# Patient Record
Sex: FEMALE | Race: WHITE | Employment: OTHER | URBAN - METROPOLITAN AREA
[De-identification: names, ages, dates, MRNs, and addresses within clinical notes are randomized per-mention and may not be internally consistent; named-entity substitution may affect disease eponyms.]

---

## 2017-10-10 ENCOUNTER — HOSPITAL ENCOUNTER (OUTPATIENT)
Dept: GENERAL RADIOLOGY | Age: 67
Discharge: HOME | End: 2017-10-10
Payer: MEDICARE

## 2017-10-10 DIAGNOSIS — R13.12 DYSPHAGIA, OROPHARYNGEAL PHASE: ICD-10-CM

## 2017-10-10 PROCEDURE — 74230 X-RAY XM SWLNG FUNCJ C+: CPT

## 2017-10-10 PROCEDURE — G8998 SWALLOW D/C STATUS: HCPCS

## 2017-10-10 PROCEDURE — G8996 SWALLOW CURRENT STATUS: HCPCS

## 2017-10-10 PROCEDURE — G8997 SWALLOW GOAL STATUS: HCPCS

## 2017-10-10 PROCEDURE — 92611 MOTION FLUOROSCOPY/SWALLOW: CPT

## 2017-10-10 PROCEDURE — 74011000255 HC RX REV CODE- 255: Performed by: INTERNAL MEDICINE

## 2017-10-10 RX ADMIN — BARIUM SULFATE 90 ML: 980 POWDER, FOR SUSPENSION ORAL at 09:23

## 2017-10-10 RX ADMIN — BARIUM SULFATE 15 ML: 400 PASTE ORAL at 09:24

## 2017-10-10 NOTE — PROGRESS NOTES
Bacilio Fuller  : 1950  [place] / [place] / Product Type: Medicare /  [address]  at [facility]  [address] [address] [address] [facility]  [phone]   [phone]       OUTPATIENT SPEECH LANGUAGE PATHOLOGY: MODIFIED BARIUM SWALLOW    ICD-10: Treatment Diagnosis: Oropharyngeal dysphagia (R13.12)   DATE: 10/10/2017  REFERRING PHYSICIAN: Mathieu Allen MD Orders: Modified Barium Swallow   PAST MEDICAL HISTORY:   Ms. Stanislav Vega is a 74 y.o. female who  has a past medical history of Abnormal EKG; Atheroscler native arteries the extremities w/intermit claudication ([address] Bronchitis; CAD (coronary artery disease); Chest pain, unspecified; Essential hypertension (2016); Extremity pain (2016); Gastrointestinal disorder; Hyperlipidemia; Hypertension; Myocardial infarction ([address] Palpitations; Pelvic abscess in female; PVD (peripheral vascular disease) ([address] Rheumatic mitral and aortic valve insufficiency; Rheumatic tricuspid regurgitation; Shingles; Shortness of breath dyspnea; Tobacco use disorder; UTI (urinary tract infection); and Vertigo. She also  has a past surgical history that includes cabg, artery-vein, four; hysterectomy; coronary artery bypass graft; and heart catheterization. RADIOLOGIST:  Dr. Sulma Sorensen  MEDICAL/REFERRING DIAGNOSIS: Dysphagia, oropharyngeal phase [R13.12]    PRECAUTIONS/ALLERGIES: Pcn [penicillins]   ASSESSMENT/PLAN OF CARE:  Based on the objective data described below, the patient presents with oral and pharyngeal phase of swallow essentially within functional limits. Intermittent trace transient laryngeal penetration without aspiration observed with thin liquids. No laryngeal penetration or aspiration observed with pudding, mixed consistency or cracker. Decreased relaxation of upper esophageal segment observed with every swallow that does not appear to affect esophageal flow.   Recommend continue current diet. No skilled speech intervention indicated for swallow at this time. Recommended to patient to be upright for all PO intake, take small bites/sips and to not talk with food/liquid in oral cavity. She may benefit from further assessment of esophageal phase of swallow if symptoms persist.    RECOMMENDATIONS AND PLANNED INTERVENTIONS (Benefits and precautions of therapy have been discussed with the patient.):  · continue prescribed diet  MEDICATIONS:  · With liquid  COMPENSATORY STRATEGIES/MODIFICATIONS INCLUDING:  · Upright for all PO  · Small bites/sips    Thank you for this referral,  Chad Andino MA, CCC-SLP  SUBJECTIVE:  Patient pleasant and cooperative. Present Symptoms: occasional coughing with meals     Current Dietary Status:  Regular textures, thin liquids      History of reflux:  [x]YES     []NO      Reflux medication:Nexium  Social History/Home Situation:       Work/Activity History: Retired    OBJECTIVE:  Objective Measure: Tool Used: National Outcomes Measurement System: Functional Communication Measures: SWALLOWING  Score:  Initial: 6 Most Recent: X (Date: -- )   Interpretation of Tool: This measure describes the change in functional communication status subsequent to speech-language pathology treatment of patients with dysphagia.  o Level 1:  Individual is not able to swallow anything safely by mouth. All nutrition and hydration is received through non-oral means (e.g., nasogastric tube, PEG). o Level 2: Individual is not able to swallow safely by mouth for nutrition and hydration, but may take some consistency with consistent maximal cues in therapy only. Alternative method of feeding required. o Level 3:  Alternative method of feeding required as individual takes less than 50% of nutrition and hydration by mouth, and/or swallowing is safe with consistent use of moderate cues to use compensatory strategies and/or requires maximum diet restriction.   o Level 4: Swallowing is safe, but usually requires moderate cues to use compensatory strategies, and/or the individual has moderate diet restrictions and/or still requires tube feeding and/or oral supplements. o Level 5:  Swallowing is safe with minimal diet restriction and/or occasionally requires minimal cueing to use compensatory strategies. The individual may occasionally self-cue. All nutrition and hydration needs are met by mouth at mealtime. o Level 6:  Swallowing is safe, and the individual eats and drinks independently and may rarely require minimal cueing. The individual usually self-cues when difficulty occurs. May need to avoid specific food items (e.g., popcorn and nuts), or require additional time (due to dysphagia). o Level 7: The individuals ability to eat independently is not limited by swallow function. Swallowing would be safe and efficient for all consistencies. Compensatory strategies are effectively used when needed. Score Level 7 Level 6 Level 5 Level 4 Level 3 Level 2 Level 1   Modifier CH CI CJ CK CL CM CN   ? Swallowing:     - CURRENT STATUS: CI - 1%-19% impaired, limited or restricted    - GOAL STATUS:  CI - 1%-19% impaired, limited or restricted    - D/C STATUS:  CI - 1%-19% impaired, limited or restricted        Cognitive/Communication Status:  Mental Status  Neurologic State: Alert  Orientation Level: Appropriate for age  Cognition: Appropriate decision making  Perception: Appears intact  Perseveration: No perseveration noted  Safety/Judgement: Awareness of environment    Oral Assessment:  Oral Assessment  Labial: No impairment  Dentition: Natural  Lingual: No impairment  Velum: No impairment    Vocal Quality: adequate    Patient Viewed:    Film Views: Lateral, Fluoro    Oral Prepatory:  The patient was given the following: Consistency Presented:  Thin liquid, Solid, Pudding, Mixed consistency  How Presented: Self-fed/presented, SLP-fed/presented, Cup/sip, Cup/gulp, Spoon, Straw, Successive swallows    Oral Phase:  Bolus Acceptance: No impairment  Bolus Formation/Control: No impairment  Propulsion: No impairment     Oral Residue: None  Initiation of Swallow: No impairment  Oral Phase Severity: No impairment    Pharyngeal Phase:  Timing: No impairment  Decreased Tongue Base Retraction?: No  Laryngeal Elevation: Incomplete laryngeal closure  Penetration: Flash/transient  Aspiration/Timing: No evidence of aspiration  Aspiration/Penetration Score: 2 (Penetration/No residue-Contrast enters the airway penetrates, remains above the folds/cords, and is cleared)  Pharyngeal Symmetry: Not assessed  Pharyngeal Dysfunction: Crico-pharyngeal dysfunction, Decreased elevation/closure  Pharyngeal Phase Severity: Minimal  Pharyngeal-Esophageal Segment: Decreased relaxation of upper esophageal segment    Assessment/Reassessment only, no treatment provided today   ________________________________________________________    Total Treatment Duration:  Time In: 0910   Time Out: [address] Brito, CCC-SLP

## 2018-02-14 PROBLEM — R00.2 PALPITATIONS: Status: ACTIVE | Noted: 2018-02-14

## 2018-03-04 ENCOUNTER — APPOINTMENT (OUTPATIENT)
Dept: GENERAL RADIOLOGY | Age: 68
End: 2018-03-04
Attending: STUDENT IN AN ORGANIZED HEALTH CARE EDUCATION/TRAINING PROGRAM
Payer: MEDICARE

## 2018-03-04 ENCOUNTER — APPOINTMENT (OUTPATIENT)
Dept: CT IMAGING | Age: 68
End: 2018-03-04
Attending: STUDENT IN AN ORGANIZED HEALTH CARE EDUCATION/TRAINING PROGRAM
Payer: MEDICARE

## 2018-03-04 ENCOUNTER — HOSPITAL ENCOUNTER (EMERGENCY)
Age: 68
Discharge: HOME | End: 2018-03-04
Attending: STUDENT IN AN ORGANIZED HEALTH CARE EDUCATION/TRAINING PROGRAM
Payer: MEDICARE

## 2018-03-04 VITALS
BODY MASS INDEX: 29.41 KG/M2 | SYSTOLIC BLOOD PRESSURE: 152 MMHG | OXYGEN SATURATION: 94 % | HEIGHT: 63 IN | DIASTOLIC BLOOD PRESSURE: 69 MMHG | WEIGHT: 166 LBS | RESPIRATION RATE: 20 BRPM | TEMPERATURE: 98.5 F | HEART RATE: 101 BPM

## 2018-03-04 DIAGNOSIS — J44.1 COPD EXACERBATION (HCC): Primary | ICD-10-CM

## 2018-03-04 LAB
ALBUMIN SERPL-MCNC: 3.6 G/DL (ref 3.2–4.6)
ALBUMIN/GLOB SERPL: 0.7 {RATIO} (ref 1.2–3.5)
ALP SERPL-CCNC: 82 U/L (ref 50–136)
ALT SERPL-CCNC: 34 U/L (ref 12–65)
ANION GAP SERPL CALC-SCNC: 11 MMOL/L (ref 7–16)
AST SERPL-CCNC: 26 U/L (ref 15–37)
ATRIAL RATE: 124 BPM
BASOPHILS # BLD: 0 K/UL (ref 0–0.2)
BASOPHILS NFR BLD: 0 % (ref 0–2)
BILIRUB SERPL-MCNC: 0.3 MG/DL (ref 0.2–1.1)
BNP SERPL-MCNC: 25 PG/ML
BUN SERPL-MCNC: 12 MG/DL (ref 8–23)
CALCIUM SERPL-MCNC: 10 MG/DL (ref 8.3–10.4)
CALCULATED P AXIS, ECG09: 77 DEGREES
CALCULATED R AXIS, ECG10: 77 DEGREES
CALCULATED T AXIS, ECG11: 79 DEGREES
CHLORIDE SERPL-SCNC: 97 MMOL/L (ref 98–107)
CO2 SERPL-SCNC: 29 MMOL/L (ref 21–32)
CREAT SERPL-MCNC: 0.82 MG/DL (ref 0.6–1)
D DIMER PPP FEU-MCNC: 0.6 UG/ML(FEU)
DIAGNOSIS, 93000: NORMAL
DIFFERENTIAL METHOD BLD: ABNORMAL
EOSINOPHIL # BLD: 0.2 K/UL (ref 0–0.8)
EOSINOPHIL NFR BLD: 1 % (ref 0.5–7.8)
ERYTHROCYTE [DISTWIDTH] IN BLOOD BY AUTOMATED COUNT: 16.4 % (ref 11.9–14.6)
GLOBULIN SER CALC-MCNC: 5.1 G/DL (ref 2.3–3.5)
GLUCOSE SERPL-MCNC: 121 MG/DL (ref 65–100)
HCT VFR BLD AUTO: 40.5 % (ref 35.8–46.3)
HGB BLD-MCNC: 12.9 G/DL (ref 11.7–15.4)
IMM GRANULOCYTES # BLD: 0.3 K/UL (ref 0–0.5)
IMM GRANULOCYTES NFR BLD AUTO: 2 % (ref 0–5)
LACTATE BLD-SCNC: 1.2 MMOL/L (ref 0.5–1.9)
LACTATE BLD-SCNC: 2.6 MMOL/L (ref 0.5–1.9)
LYMPHOCYTES # BLD: 2.2 K/UL (ref 0.5–4.6)
LYMPHOCYTES NFR BLD: 15 % (ref 13–44)
MCH RBC QN AUTO: 29.6 PG (ref 26.1–32.9)
MCHC RBC AUTO-ENTMCNC: 31.9 G/DL (ref 31.4–35)
MCV RBC AUTO: 92.9 FL (ref 79.6–97.8)
MONOCYTES # BLD: 0.9 K/UL (ref 0.1–1.3)
MONOCYTES NFR BLD: 6 % (ref 4–12)
NEUTS SEG # BLD: 11.3 K/UL (ref 1.7–8.2)
NEUTS SEG NFR BLD: 76 % (ref 43–78)
P-R INTERVAL, ECG05: 142 MS
PLATELET # BLD AUTO: 332 K/UL (ref 150–450)
PMV BLD AUTO: 10.8 FL (ref 10.8–14.1)
POTASSIUM SERPL-SCNC: 3.6 MMOL/L (ref 3.5–5.1)
PROT SERPL-MCNC: 8.7 G/DL (ref 6.3–8.2)
Q-T INTERVAL, ECG07: 300 MS
QRS DURATION, ECG06: 78 MS
QTC CALCULATION (BEZET), ECG08: 418 MS
RBC # BLD AUTO: 4.36 M/UL (ref 4.05–5.25)
SODIUM SERPL-SCNC: 137 MMOL/L (ref 136–145)
TROPONIN I BLD-MCNC: 0.01 NG/ML (ref 0.02–0.05)
TROPONIN I BLD-MCNC: 0.02 NG/ML (ref 0.02–0.05)
VENTRICULAR RATE, ECG03: 117 BPM
WBC # BLD AUTO: 14.8 K/UL (ref 4.3–11.1)

## 2018-03-04 PROCEDURE — 93005 ELECTROCARDIOGRAM TRACING: CPT | Performed by: STUDENT IN AN ORGANIZED HEALTH CARE EDUCATION/TRAINING PROGRAM

## 2018-03-04 PROCEDURE — 84484 ASSAY OF TROPONIN QUANT: CPT

## 2018-03-04 PROCEDURE — 94640 AIRWAY INHALATION TREATMENT: CPT

## 2018-03-04 PROCEDURE — 99285 EMERGENCY DEPT VISIT HI MDM: CPT | Performed by: STUDENT IN AN ORGANIZED HEALTH CARE EDUCATION/TRAINING PROGRAM

## 2018-03-04 PROCEDURE — 85025 COMPLETE CBC W/AUTO DIFF WBC: CPT | Performed by: STUDENT IN AN ORGANIZED HEALTH CARE EDUCATION/TRAINING PROGRAM

## 2018-03-04 PROCEDURE — 71260 CT THORAX DX C+: CPT

## 2018-03-04 PROCEDURE — 71046 X-RAY EXAM CHEST 2 VIEWS: CPT

## 2018-03-04 PROCEDURE — 74011636320 HC RX REV CODE- 636/320: Performed by: STUDENT IN AN ORGANIZED HEALTH CARE EDUCATION/TRAINING PROGRAM

## 2018-03-04 PROCEDURE — 74011000258 HC RX REV CODE- 258: Performed by: STUDENT IN AN ORGANIZED HEALTH CARE EDUCATION/TRAINING PROGRAM

## 2018-03-04 PROCEDURE — 74011250636 HC RX REV CODE- 250/636: Performed by: STUDENT IN AN ORGANIZED HEALTH CARE EDUCATION/TRAINING PROGRAM

## 2018-03-04 PROCEDURE — 83605 ASSAY OF LACTIC ACID: CPT

## 2018-03-04 PROCEDURE — 83880 ASSAY OF NATRIURETIC PEPTIDE: CPT | Performed by: STUDENT IN AN ORGANIZED HEALTH CARE EDUCATION/TRAINING PROGRAM

## 2018-03-04 PROCEDURE — 85379 FIBRIN DEGRADATION QUANT: CPT | Performed by: STUDENT IN AN ORGANIZED HEALTH CARE EDUCATION/TRAINING PROGRAM

## 2018-03-04 PROCEDURE — 74011000250 HC RX REV CODE- 250: Performed by: STUDENT IN AN ORGANIZED HEALTH CARE EDUCATION/TRAINING PROGRAM

## 2018-03-04 PROCEDURE — 96360 HYDRATION IV INFUSION INIT: CPT | Performed by: STUDENT IN AN ORGANIZED HEALTH CARE EDUCATION/TRAINING PROGRAM

## 2018-03-04 PROCEDURE — 80053 COMPREHEN METABOLIC PANEL: CPT | Performed by: STUDENT IN AN ORGANIZED HEALTH CARE EDUCATION/TRAINING PROGRAM

## 2018-03-04 RX ORDER — IPRATROPIUM BROMIDE AND ALBUTEROL SULFATE 2.5; .5 MG/3ML; MG/3ML
3 SOLUTION RESPIRATORY (INHALATION)
Status: COMPLETED | OUTPATIENT
Start: 2018-03-04 | End: 2018-03-04

## 2018-03-04 RX ORDER — LEVOFLOXACIN 5 MG/ML
750 INJECTION, SOLUTION INTRAVENOUS
Status: DISCONTINUED | OUTPATIENT
Start: 2018-03-04 | End: 2018-03-04

## 2018-03-04 RX ORDER — SODIUM CHLORIDE 0.9 % (FLUSH) 0.9 %
10 SYRINGE (ML) INJECTION
Status: COMPLETED | OUTPATIENT
Start: 2018-03-04 | End: 2018-03-04

## 2018-03-04 RX ADMIN — IOPAMIDOL 100 ML: 755 INJECTION, SOLUTION INTRAVENOUS at 12:05

## 2018-03-04 RX ADMIN — IPRATROPIUM BROMIDE AND ALBUTEROL SULFATE 3 ML: 2.5; .5 SOLUTION RESPIRATORY (INHALATION) at 11:33

## 2018-03-04 RX ADMIN — SODIUM CHLORIDE 500 ML: 900 INJECTION, SOLUTION INTRAVENOUS at 12:29

## 2018-03-04 RX ADMIN — SODIUM CHLORIDE 100 ML: 900 INJECTION, SOLUTION INTRAVENOUS at 12:05

## 2018-03-04 RX ADMIN — Medication 10 ML: at 12:05

## 2018-03-04 NOTE — ED PROVIDER NOTES
HPI Comments: 72-year-old female patient presents with reports of rapid heart rate, chest pressure and shortness of breath. She states her symptoms started yesterday. She reports feelings of palpitations/rrapid heart rate but states she has not had significant pain in her chest.  She also reports shortness of breath is worsened with any exertion. She has a history of chronic lung disease and emphysema. She checks her oxygen level regularly and says it has been as low as the high 70s with exertion. She uses oxygen at home as needed and is required this oxygen more frequently. She reports chronic cough unchanged at this time. She denies associated fever or chills. Patient reports mild nausea but no vomiting. She denies abdominal pain or changes in bowel or bladder habits. She reports a history of a three-way bypass in 1995. She reports previous MI in the past as well. She recently wore a Holter monitor per scribed by her cardiologist for tachycardia which showed no acute abnormality. Patient is a 74 y.o. female presenting with rapid heart beat and shortness of breath. The history is provided by the patient. Rapid Heart Rate   This is a new problem. The current episode started yesterday. The problem occurs constantly. The problem has not changed since onset. Associated symptoms include shortness of breath. Pertinent negatives include no chest pain, no abdominal pain and no headaches. Nothing aggravates the symptoms. Nothing relieves the symptoms. She has tried nothing for the symptoms. Shortness of Breath   This is a new problem. The current episode started yesterday. The problem has not changed since onset. Associated symptoms include cough.  Pertinent negatives include no fever, no headaches, no coryza, no rhinorrhea, no sore throat, no swollen glands, no ear pain, no neck pain, no sputum production, no hemoptysis, no wheezing, no PND, no orthopnea, no chest pain, no syncope, no vomiting, no abdominal pain, no rash, no leg pain, no leg swelling and no claudication. She has tried nothing for the symptoms. She has had prior hospitalizations. She has had prior ED visits. Associated medical issues include chronic lung disease. Past Medical History:   Diagnosis Date    Abnormal EKG     Atheroscler native arteries the extremities w/intermit claudication (HCC)     Bronchitis     CAD (coronary artery disease)     Chest pain, unspecified     Essential hypertension 4/27/2016    Extremity pain 4/27/2016    Gastrointestinal disorder     colitis    Hyperlipidemia     Hypertension     Myocardial infarction [facility])     Old    Palpitations     Jun 2014- 24 hour monitor- occ pvc, poor symptom correlation    Pelvic abscess in female     Resolved on follow up ultrasound    PVD (peripheral vascular disease) (HCC)     Minimal    Rheumatic mitral and aortic valve insufficiency     Rheumatic tricuspid regurgitation     Shingles     Shortness of breath dyspnea     Tobacco use disorder     Resolved    UTI (urinary tract infection)     Vertigo     Chronic Intermittent       Past Surgical History:   Procedure Laterality Date    CABG, ARTERY-VEIN, FOUR      HX CORONARY ARTERY BYPASS GRAFT      HX HEART CATHETERIZATION      HX HYSTERECTOMY           Family History:   Problem Relation Age of Onset    Breast Cancer Maternal Aunt        Social History     Social History    Marital status:      Spouse name: N/A    Number of children: N/A    Years of education: N/A     Occupational History    Not on file.      Social History Main Topics    Smoking status: Former Smoker     Quit date: 5/1/2007    Smokeless tobacco: Not on file    Alcohol use 0.5 oz/week     1 Glasses of wine per week    Drug use: No    Sexual activity: Not on file     Other Topics Concern    Not on file     Social History Narrative         ALLERGIES: Pcn [penicillins]    Review of Systems   Constitutional: Negative for chills, diaphoresis and fever. HENT: Negative for congestion, ear pain, rhinorrhea, sneezing and sore throat. Eyes: Negative for visual disturbance. Respiratory: Positive for cough and shortness of breath. Negative for hemoptysis, sputum production, chest tightness and wheezing. Cardiovascular: Negative for chest pain, orthopnea, claudication, leg swelling, syncope and PND. Gastrointestinal: Negative for abdominal pain, blood in stool, diarrhea, nausea and vomiting. Endocrine: Negative for polyuria. Genitourinary: Negative for difficulty urinating, dysuria, flank pain, hematuria and urgency. Musculoskeletal: Negative for back pain, myalgias, neck pain and neck stiffness. Skin: Negative for color change and rash. Neurological: Negative for dizziness, syncope, speech difficulty, weakness, light-headedness, numbness and headaches. Psychiatric/Behavioral: Negative for behavioral problems. All other systems reviewed and are negative. Vitals:    03/04/18 1054   Pulse: (!) 115   Resp: 20   Temp: 97.5 °F (36.4 °C)   SpO2: (!) 85%   Weight: 75.3 kg (166 lb)   Height: 5' 3\" (1.6 m)            Physical Exam   Constitutional: She is oriented to person, place, and time. She appears well-developed and well-nourished. No distress. Alert and oriented to person, place and time. No acute distress. Speaks in clear, fluent sentences. HENT:   Head: Normocephalic and atraumatic. Right Ear: External ear normal.   Left Ear: External ear normal.   Nose: Nose normal.   Mouth/Throat: Oropharynx is clear and moist.   Eyes: Conjunctivae and EOM are normal. Pupils are equal, round, and reactive to light. Neck: Normal range of motion. Neck supple. No JVD present. No tracheal deviation present. Cardiovascular: Regular rhythm, S1 normal, S2 normal, normal heart sounds and intact distal pulses. Tachycardia present. Exam reveals no gallop, no distant heart sounds and no friction rub.     No murmur heard.  Pulmonary/Chest: Effort normal. No accessory muscle usage or stridor. No tachypnea and no bradypnea. No respiratory distress. She has decreased breath sounds. She has no wheezes. She has no rhonchi. She has no rales. She exhibits no tenderness. diminished throughout   Abdominal: Soft. Normal appearance. She exhibits no distension and no mass. There is no hepatosplenomegaly, splenomegaly or hepatomegaly. There is no tenderness. There is no rigidity, no rebound, no guarding, no CVA tenderness, no tenderness at McBurney's point and negative Gama's sign. Musculoskeletal: Normal range of motion. She exhibits no edema, tenderness or deformity. Neurological: She is alert and oriented to person, place, and time. No cranial nerve deficit. Skin: Skin is warm and dry. No rash noted. She is not diaphoretic. Psychiatric: She has a normal mood and affect. Her behavior is normal.   Nursing note and vitals reviewed. MDM  Number of Diagnoses or Management Options  COPD exacerbation [facility]): new and requires workup  Diagnosis management comments: EKG obtained on arrival shows a sinus tachycardia with rate of 117. No evidence of acute ischemia. Patient's laboratory evaluation is mostly unremarkable however her white blood cell count is slightly elevated. Patient is on chronic steroid therapy and also suffers from rheumatoid arthritis. She states that her white blood cell count is regularly elevated and today's value is actually improved from her chronic elevation. Her lactic acid is slightly elevated as well though she has no signs of infectious process. Mild elevation in d-dimer today prompted CT imaging of the chest rule out pulmonary embolism. This imaging study shows no evidence of PE or underlying infection. Chronic changes in the lungs are visible. Patient is feeling back to her baseline respiratory status. She does have oxygen for use at home.   She would prefer not to receive antibiotic therapy at this time as she is scheduled for Remicade treatment later this week. She does present with some slight tachycardia and has an elevated leukocytosis and lactic acid. I suspect that these are chronic findings. We will repeat patient's lactic acid and troponin level after IV fluid hydration. Patient is agreeable with this plan. Repeat troponin and lactic acid are within normal limits. Patient is at her baseline respiratory status. She does not wish to receive antibiotics at this time. I counseled patient on reasons to return to the emergency department. She agrees to arrange close outpatient follow-up.        Amount and/or Complexity of Data Reviewed  Clinical lab tests: ordered and reviewed  Tests in the radiology section of CPT®: ordered and reviewed  Tests in the medicine section of CPT®: ordered and reviewed  Independent visualization of images, tracings, or specimens: yes    Risk of Complications, Morbidity, and/or Mortality  Presenting problems: moderate  Diagnostic procedures: low  Management options: moderate    Patient Progress  Patient progress: stable        ED Course       Procedures

## 2018-03-04 NOTE — ED TRIAGE NOTES
Patient complaining of shortness of breath and rapid heart rate that started yesterday. Patient advises decreased her steroids over past week. Patient denies any pain. Patient at 85% on room air and placed on NC at 2 lpm with increase to 95%. Dr. Anselmo Jasso at bedside.

## 2018-03-04 NOTE — ED NOTES
I have reviewed discharge instructions with the patient. The patient verbalized understanding. Patient left ED via Discharge Method: ambulatory to Home with friend/family. Opportunity for questions and clarification provided. Patient given 0 scripts. To continue your aftercare when you leave the hospital, you may receive an automated call from our care team to check in on how you are doing. This is a free service and part of our promise to provide the best care and service to meet your aftercare needs.  If you have questions, or wish to unsubscribe from this service please call [phone]. Thank you for Choosing our [facility] Emergency Department.

## 2018-03-04 NOTE — ED NOTES
Bedside report given to [facility], care transferred.

## 2018-04-25 ENCOUNTER — HOSPITAL ENCOUNTER (OUTPATIENT)
Dept: LAB | Age: 68
Discharge: HOME | End: 2018-04-25

## 2018-04-25 PROCEDURE — 88305 TISSUE EXAM BY PATHOLOGIST: CPT | Performed by: INTERNAL MEDICINE

## 2019-12-04 ENCOUNTER — HOSPITAL ENCOUNTER (OUTPATIENT)
Dept: GENERAL RADIOLOGY | Age: 69
Discharge: HOME | End: 2019-12-04
Payer: MEDICARE

## 2019-12-04 DIAGNOSIS — R52 PAIN: ICD-10-CM

## 2019-12-04 PROCEDURE — 72110 X-RAY EXAM L-2 SPINE 4/>VWS: CPT

## 2020-10-06 ENCOUNTER — HOSPITAL ENCOUNTER (OUTPATIENT)
Dept: GENERAL RADIOLOGY | Age: 70
Discharge: HOME | End: 2020-10-06
Payer: MEDICARE

## 2020-10-06 DIAGNOSIS — M79.671 RIGHT FOOT PAIN: ICD-10-CM

## 2020-10-06 PROCEDURE — 73620 X-RAY EXAM OF FOOT: CPT

## 2020-10-28 ENCOUNTER — APPOINTMENT (RX ONLY)
Dept: URBAN - METROPOLITAN AREA CLINIC 349 | Facility: CLINIC | Age: 70
Setting detail: DERMATOLOGY
End: 2020-10-28

## 2020-10-28 DIAGNOSIS — D22 MELANOCYTIC NEVI: ICD-10-CM

## 2020-10-28 DIAGNOSIS — L30.4 ERYTHEMA INTERTRIGO: ICD-10-CM

## 2020-10-28 DIAGNOSIS — L30.9 DERMATITIS, UNSPECIFIED: ICD-10-CM

## 2020-10-28 PROBLEM — D22.5 MELANOCYTIC NEVI OF TRUNK: Status: ACTIVE | Noted: 2020-10-28

## 2020-10-28 PROCEDURE — ? COUNSELING

## 2020-10-28 PROCEDURE — ? BIOPSY BY PUNCH METHOD

## 2020-10-28 PROCEDURE — ? REFERRAL

## 2020-10-28 PROCEDURE — A4550 SURGICAL TRAYS: HCPCS

## 2020-10-28 PROCEDURE — ? PRESCRIPTION

## 2020-10-28 PROCEDURE — 11104 PUNCH BX SKIN SINGLE LESION: CPT

## 2020-10-28 PROCEDURE — 99203 OFFICE O/P NEW LOW 30 MIN: CPT | Mod: 25

## 2020-10-28 RX ORDER — TRIAMCINOLONE ACETONIDE 1 MG/G
CREAM TOPICAL
Qty: 1 | Refills: 1 | Status: ERX | COMMUNITY
Start: 2020-10-28

## 2020-10-28 RX ORDER — NYSTATIN 100000 [USP'U]/G
CREAM TOPICAL
Qty: 1 | Refills: 5 | Status: ERX | COMMUNITY
Start: 2020-10-28

## 2020-10-28 RX ADMIN — NYSTATIN: 100000 CREAM TOPICAL at 00:00

## 2020-10-28 RX ADMIN — TRIAMCINOLONE ACETONIDE: 1 CREAM TOPICAL at 00:00

## 2020-10-28 ASSESSMENT — LOCATION ZONE DERM: LOCATION ZONE: TRUNK

## 2020-10-28 ASSESSMENT — LOCATION DETAILED DESCRIPTION DERM
LOCATION DETAILED: RIGHT SUPERIOR MEDIAL UPPER BACK
LOCATION DETAILED: LEFT SUPRAPUBIC SKIN
LOCATION DETAILED: RIGHT INFERIOR MEDIAL UPPER BACK
LOCATION DETAILED: LEFT SUPERIOR UPPER BACK

## 2020-10-28 ASSESSMENT — LOCATION SIMPLE DESCRIPTION DERM
LOCATION SIMPLE: LEFT UPPER BACK
LOCATION SIMPLE: RIGHT UPPER BACK
LOCATION SIMPLE: GROIN

## 2020-10-28 NOTE — PROCEDURE: BIOPSY BY PUNCH METHOD
Size Of Lesion In Cm (Optional): 0
Wound Care: Vaseline
Detail Level: Detailed
Bill 15703 For Specimen Handling/Conveyance To Laboratory?: no
Epidermal Sutures: 5-0 Nylon
Bill For Surgical Tray: yes
Punch Size In Mm: 4
Additional Anesthesia Volume In Cc (Will Not Render If 0): 0.6
Accession #: formal
Post-Care Instructions: I reviewed with the patient in detail post-care instructions. Patient is to keep the biopsy site dry overnight, and then apply bacitracin twice daily until healed. Patient may apply hydrogen peroxide soaks to remove any crusting.
Biopsy Type: H and E
Hemostasis: None
Anesthesia Type: 2% lidocaine with epinephrine
Dressing: bandage
Information: Selecting Yes will display possible errors in your note based on the variables you have selected. This validation is only offered as a suggestion for you. PLEASE NOTE THAT THE VALIDATION TEXT WILL BE REMOVED WHEN YOU FINALIZE YOUR NOTE. IF YOU WANT TO FAX A PRELIMINARY NOTE YOU WILL NEED TO TOGGLE THIS TO 'NO' IF YOU DO NOT WANT IT IN YOUR FAXED NOTE.
Billing Type: Third-Party Bill
Anesthesia Volume In Cc (Will Not Render If 0): 0.2
Notification Instructions: Patient will be notified of biopsy results. However, patient instructed to call the office if not contacted within 2 weeks.
Consent: Written consent was obtained and risks were reviewed including but not limited to scarring, infection, bleeding, scabbing, incomplete removal, nerve damage and allergy to anesthesia.
Suture Removal: 14 days

## 2020-10-28 NOTE — PROCEDURE: REASSURANCE
DATE: 7/16/2019.



EXAM: MAMMO TIO SCREENING BILATERAL



HISTORY: Routine screening.



COMPARISON: Previous mammogram from 2018 and 2017.



This study was interpreted with the benefit of Computerized Aided Detection

(CAD).



FINDINGS:



Breast Density: DENSE  The breast Parenchyma is dense, which could reduce the

sensitivity of mammography. Breast parenchyma level density D..  The skin and

nipples are within normal limits.  Bilateral global calcifications are seen. 

No suspicious calcifications, spiculated mass or area of architectural

distortion.











IMPRESSION: Stable exam.  No mammographic evidence of malignancy.







BI-RADS CATEGORY: 2 BENIGN FINDING(S)



RECOMMENDED FOLLOW-UP: 12M 12 MONTH FOLLOW-UP.  Given significantly dense

breast tissue consider adding whole breast ultrasound with annual mammogram

screening.



PQRS compliance statement: Patient information was entered into a reminder

system with a target due date for the next mammogram.



Mammography is a sensitive method for finding small breast cancers, but it

does not detect them all and is not a substitute for careful clinical

examination.  A negative mammogram does not negate a clinically suspicious

finding and should not result in delay in biopsying a clinically suspicious

abnormality.



"Our facility is accredited by the American College [employer] Mammography

Program."
Include Location In Plan?: Yes
Hide Additional Notes?: No
Detail Level: Zone

## 2020-11-11 ENCOUNTER — APPOINTMENT (RX ONLY)
Dept: URBAN - METROPOLITAN AREA CLINIC 349 | Facility: CLINIC | Age: 70
Setting detail: DERMATOLOGY
End: 2020-11-11

## 2020-11-11 DIAGNOSIS — Z48.02 ENCOUNTER FOR REMOVAL OF SUTURES: ICD-10-CM

## 2020-11-11 DIAGNOSIS — L30.9 DERMATITIS, UNSPECIFIED: ICD-10-CM

## 2020-11-11 PROCEDURE — 99024 POSTOP FOLLOW-UP VISIT: CPT

## 2020-11-11 PROCEDURE — ? TREATMENT REGIMEN

## 2020-11-11 PROCEDURE — ? COUNSELING

## 2020-11-11 PROCEDURE — 99213 OFFICE O/P EST LOW 20 MIN: CPT

## 2020-11-11 PROCEDURE — ? SUTURE REMOVAL (GLOBAL PERIOD)

## 2020-11-11 ASSESSMENT — LOCATION ZONE DERM: LOCATION ZONE: TRUNK

## 2020-11-11 ASSESSMENT — LOCATION SIMPLE DESCRIPTION DERM
LOCATION SIMPLE: RIGHT UPPER BACK
LOCATION SIMPLE: LEFT UPPER BACK

## 2020-11-11 ASSESSMENT — LOCATION DETAILED DESCRIPTION DERM
LOCATION DETAILED: RIGHT SUPERIOR MEDIAL UPPER BACK
LOCATION DETAILED: LEFT SUPERIOR UPPER BACK

## 2020-11-11 NOTE — PROCEDURE: SUTURE REMOVAL (GLOBAL PERIOD)
Add 39164 Cpt? (Important Note: In 2017 The Use Of 67130 Is Being Tracked By Cms To Determine Future Global Period Reimbursement For Global Periods): yes
Detail Level: Detailed

## 2020-11-12 ENCOUNTER — RX ONLY (OUTPATIENT)
Age: 70
Setting detail: RX ONLY
End: 2020-11-12

## 2020-11-12 RX ORDER — TRIAMCINOLONE ACETONIDE 1 MG/G
CREAM TOPICAL
Qty: 1 | Refills: 3 | Status: ERX

## 2020-11-13 ENCOUNTER — RX ONLY (OUTPATIENT)
Age: 70
Setting detail: RX ONLY
End: 2020-11-13

## 2020-11-13 RX ORDER — FLUTICASONE PROPIONATE 0.05 MG/G
OINTMENT TOPICAL
Qty: 1 | Refills: 0 | Status: ERX | COMMUNITY
Start: 2020-11-13

## 2021-05-04 ENCOUNTER — ANESTHESIA EVENT (OUTPATIENT)
Dept: ENDOSCOPY | Age: 71
End: 2021-05-04
Payer: MEDICARE

## 2021-05-04 RX ORDER — SODIUM CHLORIDE, SODIUM LACTATE, POTASSIUM CHLORIDE, CALCIUM CHLORIDE 600; 310; 30; 20 MG/100ML; MG/100ML; MG/100ML; MG/100ML
100 INJECTION, SOLUTION INTRAVENOUS CONTINUOUS
Status: CANCELLED | OUTPATIENT
Start: 2021-05-04

## 2021-05-05 ENCOUNTER — HOSPITAL ENCOUNTER (OUTPATIENT)
Age: 71
Setting detail: OUTPATIENT SURGERY
Discharge: HOME | End: 2021-05-05
Attending: INTERNAL MEDICINE | Admitting: INTERNAL MEDICINE
Payer: MEDICARE

## 2021-05-05 ENCOUNTER — ANESTHESIA (OUTPATIENT)
Dept: ENDOSCOPY | Age: 71
End: 2021-05-05
Payer: MEDICARE

## 2021-05-05 VITALS
HEIGHT: 62 IN | RESPIRATION RATE: 16 BRPM | SYSTOLIC BLOOD PRESSURE: 154 MMHG | TEMPERATURE: 96.8 F | DIASTOLIC BLOOD PRESSURE: 64 MMHG | WEIGHT: 155 LBS | BODY MASS INDEX: 28.52 KG/M2 | HEART RATE: 83 BPM | OXYGEN SATURATION: 100 %

## 2021-05-05 PROCEDURE — 76040000025: Performed by: INTERNAL MEDICINE

## 2021-05-05 PROCEDURE — 2709999900 HC NON-CHARGEABLE SUPPLY: Performed by: INTERNAL MEDICINE

## 2021-05-05 PROCEDURE — 74011250636 HC RX REV CODE- 250/636: Performed by: ANESTHESIOLOGY

## 2021-05-05 PROCEDURE — 74011250636 HC RX REV CODE- 250/636: Performed by: STUDENT IN AN ORGANIZED HEALTH CARE EDUCATION/TRAINING PROGRAM

## 2021-05-05 PROCEDURE — 77030021593 HC FCPS BIOP ENDOSC BSC -A: Performed by: INTERNAL MEDICINE

## 2021-05-05 PROCEDURE — 76060000032 HC ANESTHESIA 0.5 TO 1 HR: Performed by: INTERNAL MEDICINE

## 2021-05-05 PROCEDURE — 74011000250 HC RX REV CODE- 250: Performed by: STUDENT IN AN ORGANIZED HEALTH CARE EDUCATION/TRAINING PROGRAM

## 2021-05-05 PROCEDURE — 88305 TISSUE EXAM BY PATHOLOGIST: CPT

## 2021-05-05 RX ORDER — SODIUM CHLORIDE, SODIUM LACTATE, POTASSIUM CHLORIDE, CALCIUM CHLORIDE 600; 310; 30; 20 MG/100ML; MG/100ML; MG/100ML; MG/100ML
INJECTION, SOLUTION INTRAVENOUS
Status: DISCONTINUED | OUTPATIENT
Start: 2021-05-05 | End: 2021-05-05 | Stop reason: HOSPADM

## 2021-05-05 RX ORDER — PROPOFOL 10 MG/ML
INJECTION, EMULSION INTRAVENOUS AS NEEDED
Status: DISCONTINUED | OUTPATIENT
Start: 2021-05-05 | End: 2021-05-05 | Stop reason: HOSPADM

## 2021-05-05 RX ORDER — SODIUM CHLORIDE, SODIUM LACTATE, POTASSIUM CHLORIDE, CALCIUM CHLORIDE 600; 310; 30; 20 MG/100ML; MG/100ML; MG/100ML; MG/100ML
100 INJECTION, SOLUTION INTRAVENOUS CONTINUOUS
Status: DISCONTINUED | OUTPATIENT
Start: 2021-05-05 | End: 2021-05-05 | Stop reason: HOSPADM

## 2021-05-05 RX ORDER — LIDOCAINE HYDROCHLORIDE 20 MG/ML
INJECTION, SOLUTION EPIDURAL; INFILTRATION; INTRACAUDAL; PERINEURAL AS NEEDED
Status: DISCONTINUED | OUTPATIENT
Start: 2021-05-05 | End: 2021-05-05 | Stop reason: HOSPADM

## 2021-05-05 RX ORDER — PROPOFOL 10 MG/ML
INJECTION, EMULSION INTRAVENOUS
Status: DISCONTINUED | OUTPATIENT
Start: 2021-05-05 | End: 2021-05-05 | Stop reason: HOSPADM

## 2021-05-05 RX ADMIN — SODIUM CHLORIDE, SODIUM LACTATE, POTASSIUM CHLORIDE, AND CALCIUM CHLORIDE: 600; 310; 30; 20 INJECTION, SOLUTION INTRAVENOUS at 10:22

## 2021-05-05 RX ADMIN — PROPOFOL 60 MG: 10 INJECTION, EMULSION INTRAVENOUS at 10:25

## 2021-05-05 RX ADMIN — PROPOFOL 20 MG: 10 INJECTION, EMULSION INTRAVENOUS at 10:27

## 2021-05-05 RX ADMIN — SODIUM CHLORIDE, SODIUM LACTATE, POTASSIUM CHLORIDE, AND CALCIUM CHLORIDE 100 ML/HR: 600; 310; 30; 20 INJECTION, SOLUTION INTRAVENOUS at 10:10

## 2021-05-05 RX ADMIN — PROPOFOL 160 MCG/KG/MIN: 10 INJECTION, EMULSION INTRAVENOUS at 10:25

## 2021-05-05 RX ADMIN — LIDOCAINE HYDROCHLORIDE 100 MG: 20 INJECTION, SOLUTION EPIDURAL; INFILTRATION; INTRACAUDAL; PERINEURAL at 10:25

## 2021-05-05 NOTE — DISCHARGE INSTRUCTIONS
Gastrointestinal Esophagogastroduodenoscopy (EGD)/ Endoscopic Ultrasound(EUS)- Upper Exam Discharge Instructions    1. Call Dr. Ovidio Gomes  for any problems or questions. 2. Contact the doctor's office for follow up appointment as directed. 3. Medication may cause drowsiness for several hours, therefore, do not drive or operate machinery for remainder of the day. 4. No alcohol today. 5. Do not make any important decisions such as signing legal paperwork. 6. Ordinarily, you may resume regular diet and activity after exam unless otherwise specified by your physician. 7. For mild soreness in your throat you may use Cepacol throat lozenges or warm  salt-water gargles as needed. Gastrointestinal Colonoscopy/Flexible Sigmoidoscopy - Lower Exam Discharge Instructions  1. Call Dr. Ovidio Gomes for any problems or questions. 2. Contact the doctors office for follow up appointment as directed  3. Medication may cause drowsiness for several hours, therefore, do not drive or operate machinery for remainder of the day. 4. No alcohol today. 5. Do not make any important decisions such signing legal paperwork. 6. Ordinarily, you may resume regular diet and activity after exam unless otherwise specified by your physician. 7. Because of air put into your colon during exam, you may experience some abdominal distension, relieved by the passage of gas, for several hours. 8. Contact your physician if you have any of the following:  a. Excessive amount of bleeding - large amount when having a bowel movement. Occasional specks of blood with bowel movement would not be unusual.  b. Severe abdominal pain  c. Fever or Chills  9. Polyp Removal - follow these additional instructions  a. Soft diet for 24 hours, then resume regular diet   b. No Aspirin, Advil, Aleve, Nuprin, Ibuprofen, or medications that contain these drugs for 2 weeks. Any additional instructions:    Follow up pathology results  Consider step - down therapy if bx benign        Instructions given to Nelwyn Pain and other family members.

## 2021-05-05 NOTE — ANESTHESIA PREPROCEDURE EVALUATION
Relevant Problems   No relevant active problems       Anesthetic History   No history of anesthetic complications            Review of Systems / Medical History  Pertinent labs reviewed    Pulmonary    COPD (NC O2 at night and prn): severe      Smoker (former)      Comments: ILD   Neuro/Psych   Within defined limits           Cardiovascular    Hypertension        Dysrhythmias : PVC  Past MI, CAD, PAD and CABG    Exercise tolerance: <4 METS  Comments: Normal stress test 2019. GI/Hepatic/Renal     GERD: well controlled           Endo/Other        Arthritis (RA) and cancer (breast)    Comments: Raynaud's   Other Findings              Physical Exam    Airway  Mallampati: II  TM Distance: 4 - 6 cm  Neck ROM: normal range of motion   Mouth opening: Normal     Cardiovascular    Rhythm: regular  Rate: abnormal        Comments: Slightly tachy Dental    Dentition: Caps/crowns     Pulmonary  Breath sounds clear to auscultation              Comments: Distant Abdominal  GI exam deferred       Other Findings            Anesthetic Plan    ASA: 4  Anesthesia type: total IV anesthesia          Induction: Intravenous  Anesthetic plan and risks discussed with: Patient      POM mask.

## 2021-05-05 NOTE — H&P
Gastroenterology Outpatient History and Physical    Patient: Mary Rajendra    Physician: Leo Lopez MD    Chief Complaint: PALOMO    History of Present Illness: H/O colitis    Justification for Procedure: As above    History:  Past Medical History:   Diagnosis Date    Abnormal EKG     Atheroscler native arteries the extremities w/intermit [facility]audication [facility]     Autoimmune disease [facility]     Breast cancer ([facility]     Bronchitis     CAD (coronary artery disease)     Chest pain, unspecified     Chronic obstructive pulmonary disease ([address]     Essential hypertension 2016    Extremity pain 2016    Gastrointestinal disorder     colitis    GERD (gastroesophageal reflux disease)     Hyperlipidemia     Hypertension     Interstitial lung disease ([address]     Myocardial infarction ([address]     Old    Palpitations     2014- 24 hour monitor- occ pvc, poor symptom correlation    Pelvic abscess in female     Resolved on follow up ultrasound    PVD (peripheral vascular disease) ([facility]     Minimal    Raynaud's disease     Rheumatic mitral and aortic valve insufficiency     Rheumatic tricuspid regurgitation     Shingles     Shortness of breath dyspnea     Tobacco use disorder     Resolved    UTI (urinary tract infection)     Vertigo     Chronic Intermittent      Past Surgical History:   Procedure Laterality Date    HX CORONARY ARTERY BYPASS GRAFT      HX HEART CATHETERIZATION      HX HYSTERECTOMY      IN BREAST SURGERY PROCEDURE UNLISTED Right     lumpectomy    IN CABG, ARTERY-VEIN, FOUR        Social History     Socioeconomic History    Marital status:      Spouse name: Not on file    Number of children: Not on file    Years of education: Not on file    Highest education level: Not on file   Tobacco Use    Smoking status: Former Smoker     Quit date: 2007     Years since quittin.0    Smokeless tobacco: Never Used   Substance and Sexual Activity    Alcohol use: Yes     Alcohol/week: 7.0 standard drinks     Types: 7 Glasses of wine per week    Drug use: No      Family History   Problem Relation Age of Onset    Breast Cancer Maternal Aunt        Allergies: Allergies   Allergen Reactions    Macrobid [Nitrofurantoin Monohyd/M-Cryst] Other (comments)     Pulmonologist doesn't want pt to take     Pcn [Penicillins] Swelling       Medications:   Prior to Admission medications    Medication Sig Start Date End Date Taking? Authorizing Provider   mycophenolate (CellCept) 500 mg tablet Take 500 mg by mouth two (2) times a day. Yes Provider, Historical   Oxygen 2L/ NC at night   3L/ NC PRN with exertion   Yes Provider, Historical   fluticasone-umeclidin-vilanter (Trelegy Ellipta) 200-62.5-25 mcg dsdv Take  by inhalation daily. Yes Provider, Historical   lisinopriL (PRINIVIL, ZESTRIL) 40 mg tablet TAKE 1 TABLET BY MOUTH  DAILY  Patient taking differently: nightly. 9/30/20  Yes German Robbins MD   cimetidine (TAGAMET) 200 mg tab Take 200 mg by mouth daily. Yes Provider, Historical   ascorbic acid, vitamin C, (VITAMIN C) 500 mg tablet Take  by mouth. Yes Provider, Historical   diclofenac (VOLTAREN) 1 % gel  1/31/20  Yes Provider, Historical   B.infantis-B.ani-B.long-B.bifi (PROBIOTIC 4X) 10-15 mg TbEC Take  by mouth. Yes Provider, Historical   albuterol (PROVENTIL HFA, VENTOLIN HFA, PROAIR HFA) 90 mcg/actuation inhaler Take 2 Puffs by inhalation as needed. 5/1/18  Yes Provider, Historical   tamoxifen (NOLVADEX) 10 mg tablet Take  by mouth daily. Yes Provider, Historical   atorvastatin (LIPITOR) 80 mg tablet Take 1 Tab by mouth daily. Patient taking differently: Take 80 mg by mouth nightly. 5/12/17  Yes German Robbins MD   potassium 99 mg tablet Take 99 mg by mouth daily. Yes Provider, Historical   esomeprazole (NEXIUM) 40 mg capsule Take 40 mg by mouth daily. Yes Provider, Historical   aspirin 81 mg chewable tablet Take 162 mg by mouth daily.    Yes Provider, Historical   Mesalamine (LIALDA) 1.2 g delayed release tablet Take 2.4 g by mouth Daily (before breakfast). Yes Provider, Historical   loratadine (CLARITIN) 10 mg tablet Take 10 mg by mouth daily. Yes Provider, Historical   b complex vitamins tablet Take 1 Tab by mouth daily. Yes Provider, Historical   diphenhydrAMINE (BENADRYL ALLERGY) 25 mg tablet Take 25 mg by mouth every six (6) hours as needed for Sleep. Yes Provider, Historical   MILK THISTLE PO Take 1,000 mg by mouth. Yes Provider, Historical   calcium-vitamin D 600 mg(1,500mg) -200 unit tab Take 1 Tab by mouth daily (with breakfast). Yes Provider, Historical   traMADol (ULTRAM) 50 mg tablet Take 1 Tab by mouth every six (6) hours as needed for Pain. 5/28/12  Yes Mary Morel PA   estradioL (ESTRACE) 0.01 % (0.1 mg/gram) vaginal cream  11/27/19   Provider, Historical   ketoconazole (NIZORAL) 2 % topical cream daily as needed. 1/2/20   Provider, Historical   montelukast (SINGULAIR) 10 mg tablet Take 10 mg by mouth nightly. Provider, Historical   inFLIXimab (REMICADE) 100 mg injection 5 mg/kg by IntraVENous route every six (6) weeks. Provider, Historical   magnesium oxide (MAG-OX) 400 mg tablet Take 400 mg by mouth daily. Provider, Historical   UBIDECARENONE (COQ-10 PO) Take 200 mg by mouth daily. Provider, Historical   salmon oil-omega-3 fatty acids 1,000-210 mg cap Take 1 Cap by mouth daily. Provider, Historical       Vital Signs: Blood pressure (!) 169/71, pulse (!) 106, temperature 98.4 °F (36.9 °C), resp. rate 18, height 5' 2\" (1.575 m), weight 70.3 kg (155 lb), SpO2 100 %, not currently breastfeeding.     Physical Exam:   General: alert      Heart: regular rate and rhythm   Lungs: no tachypnea, retractions or cyanosis, Heart exam - S1, S2 normal, no murmur, no gallop, rate regular   Abdominal: Bowel sounds are normal, soft, non distended             Plan of Care/Planned Procedure: EGD/ [place]    Signed:  Esther Carl Ovidio Gomes MD 5/5/2021

## 2021-05-05 NOTE — PROCEDURES
Colonoscopy Procedure Note    PreOp Diagnosis:   Colon cancer screening  H/O colitis  Acute blood loss anemia    PostOp Diagnosis:  Colon polyp  Diverticulosis    Medications:  Monitored Anesthesia    Procedure:  Colonoscopy  Instrument: P  AL  After informed consent was obtained, the patient was sedated and the colonoscope was inserted  in the anus and advanced into the cecum without difficulty. The scope was slowly withdrawn while the mucosa was carefully inspected, including a retroflexed view of the rectum. Prep: Excellent    Findings:   TERMINAL ILEUM:  The terminal ileum was entered and appeared normal.  There were no inflammatory changes and the mucosa appeared intact. COLON:  The appendiceal orifice, cecum and ileocecal valve were positively identified. Retroflexion was performed in the cecum and ascending colon. The colon was carefully examined on slow withdrawal from the cecum to the rectum. There were no inflammatory changes, or vascular malformations. Diverticulosis was identified in the sigmoid colon , moderate. 3mm polyp in the transverse colon, cold forceps polypectomy. Randome biopsies obtained to follow up microscopic colitis. RECTUM:  The rectal mucosa on antegrade and retroflexed views appeared normal without inflammation, polyps or mass lesions. The digital rectal exam was unremarkable. Recommendations:  Check path  Consider colestid vs entocort  Repeat 5/10 yrs  Avoid NSAIDs  RV 3-4 months.     Erin Slaughter MD

## 2021-05-05 NOTE — ANESTHESIA POSTPROCEDURE EVALUATION
Procedure(s):  ESOPHAGOGASTRODUODENOSCOPY (EGD)  COLONOSCOPY/ BMI 29  ESOPHAGOGASTRODUODENAL (EGD) BIOPSY  COLON BIOPSY. total IV anesthesia    Anesthesia Post Evaluation        Patient location during evaluation: PACU  Patient participation: complete - patient participated  Level of consciousness: awake  Pain management: satisfactory to patient  Airway patency: patent  Anesthetic complications: no  Cardiovascular status: hemodynamically stable  Respiratory status: spontaneous ventilation  Hydration status: euvolemic  Post anesthesia nausea and vomiting:  none      INITIAL Post-op Vital signs:   Vitals Value Taken Time   /64 05/05/21 1129   Temp 36 °C (96.8 °F) 05/05/21 1058   Pulse 84 05/05/21 1131   Resp 16 05/05/21 1118   SpO2 100 % 05/05/21 1132   Vitals shown include unvalidated device data.

## 2021-05-05 NOTE — PROCEDURES
EGD Procedure Note    PreOp Diagnosis:   Acute blood loss anemia  GERD  H/O Barretts esophagus    PostOp Diagnosis:  Hiatal hernia  Short segment Barretts esophagus    Medications:  Monitored Anesthesia    Procedure:  EGD   Instrument:   After informed consent was obtained, the patient was sedated and the endoscope was inserted  in the mouth and advanced into the duodenum without difficulty. The scope was slowly withdrawn while the mucosa was carefully inspected, including a retroflexed view of the cardia and fundus. Findings:   ESOPHAGUS: The proximal and mid esophagus appeared normal.  In the distal esophagus, mucosal changes identified consistent with Cabral's esophagus extending for ~1 cm, just mildly irregular. There were no masses, strictures, or ulcers present. Multiple biopsies were obtained to rule out dysplasia. STOMACH: The gastric mucosa was unremarkable throughout. There were no erosions, ulcerations, polyps, mass lesions, vascular ectasias or other abnormalities noted. The pylorus was patent and easily intubated. There was a hiatal hernia noted by direct view and retroflexion within the stomach. DUODENUM: The endoscope was advanced as far as possible into the duodenum. The villi appeared normal.  There were no mucosal breaks, erosions, ulcerations, vascular ectasias or other abnormalities present. Recommendations:   Follow up pathology results  Consider step - down therapy if bx benign    Ether MD Deo

## 2021-05-06 ENCOUNTER — HOSPITAL ENCOUNTER (OUTPATIENT)
Dept: LAB | Age: 71
Discharge: HOME | End: 2021-05-06
Payer: MEDICARE

## 2021-05-06 DIAGNOSIS — R00.0 TACHYCARDIA: ICD-10-CM

## 2021-05-06 DIAGNOSIS — I25.118 CORONARY ARTERY DISEASE OF NATIVE ARTERY OF NATIVE HEART WITH STABLE ANGINA PECTORIS (HCC): ICD-10-CM

## 2021-05-06 DIAGNOSIS — R06.02 SHORTNESS OF BREATH: ICD-10-CM

## 2021-05-06 LAB
CHOLEST SERPL-MCNC: 158 MG/DL
HDLC SERPL-MCNC: 92 MG/DL (ref 40–60)
HDLC SERPL: 1.7 {RATIO}
LDLC SERPL CALC-MCNC: 49.6 MG/DL
LIPID PROFILE,FLP: ABNORMAL
TRIGL SERPL-MCNC: 82 MG/DL (ref 35–150)
TSH SERPL DL<=0.005 MIU/L-ACNC: 1.22 UIU/ML (ref 0.36–3.74)
VLDLC SERPL CALC-MCNC: 16.4 MG/DL (ref 6–23)

## 2021-05-06 PROCEDURE — 84443 ASSAY THYROID STIM HORMONE: CPT

## 2021-05-06 PROCEDURE — 36415 COLL VENOUS BLD VENIPUNCTURE: CPT

## 2021-05-06 PROCEDURE — 80061 LIPID PANEL: CPT

## 2021-05-06 NOTE — PROGRESS NOTES
Spiritual Care visit. Initial Visit, Pre Surgery Consult. Visit and prayer before patient goes to surgery.     Visit by Sherlyn Goss M.Ed., Th.B. ,Staff

## 2021-05-14 PROBLEM — R00.0 SINUS TACHYCARDIA: Status: ACTIVE | Noted: 2021-05-14

## 2022-03-19 PROBLEM — R00.0 SINUS TACHYCARDIA: Status: ACTIVE | Noted: 2021-05-14

## 2022-03-19 PROBLEM — R00.2 PALPITATIONS: Status: ACTIVE | Noted: 2018-02-14

## 2022-03-19 PROBLEM — I25.118 CORONARY ARTERY DISEASE OF NATIVE ARTERY OF NATIVE HEART WITH STABLE ANGINA PECTORIS (HCC): Status: ACTIVE | Noted: 2021-05-06

## 2022-06-13 ENCOUNTER — TELEPHONE (OUTPATIENT)
Dept: CARDIOLOGY CLINIC | Age: 72
End: 2022-06-13

## 2022-06-13 NOTE — TELEPHONE ENCOUNTER
Patient called and is experiencing swelling in her feet and SOB. No active chest pain. Please call and advise.

## 2022-06-13 NOTE — TELEPHONE ENCOUNTER
Patient called stating that her ankles and feet are swollen. Has been on her feet more recently with moving. No SOB other than her normal COPD and  lung disease. Patient states that feet and ankles are more swollen at the end of the day. Usually are not swollen in the morning. Not wearing support hose, elevates feet as much as possible onset was about a week ago, does not drink enough water.      Patient informed that Dr. Noe Flower would be informed of above, call back with doctors response.//elliab

## 2022-06-13 NOTE — TELEPHONE ENCOUNTER
Would try to manage conservatively with adequate water intake, low Na diet and compression socks during the day. If very symptomatic let us know and we can add a little diuretic but in this heat would rather not if we don't have to.

## 2022-07-25 RX ORDER — LISINOPRIL 40 MG/1
TABLET ORAL
Qty: 90 TABLET | Refills: 3 | Status: SHIPPED | OUTPATIENT
Start: 2022-07-25

## 2023-01-17 RX ORDER — ATORVASTATIN CALCIUM 80 MG/1
TABLET, FILM COATED ORAL
Qty: 90 TABLET | Refills: 3 | OUTPATIENT
Start: 2023-01-17

## 2023-01-17 NOTE — TELEPHONE ENCOUNTER
Requested Prescriptions     Refused Prescriptions Disp Refills    atorvastatin (LIPITOR) 80 MG tablet [Pharmacy Med Name: Atorvastatin Calcium 80 MG Oral Tablet] 90 tablet 3     Sig: TAKE 1 TABLET BY MOUTH  DAILY     Refused By: Angelina Wilson     Reason for Refusal: Patient needs an appointment

## 2023-03-05 ENCOUNTER — PATIENT MESSAGE (OUTPATIENT)
Dept: CARDIOLOGY CLINIC | Age: 73
End: 2023-03-05

## 2023-03-06 NOTE — TELEPHONE ENCOUNTER
From: Aries Almonte  To: Dr. Veloz Shawnee: 3/5/2023 3:15 PM EST  Subject: Heart racing    Dr Nichelle Motta,  For approximately three weeks every time I get up to do anything my heart rate goes up making me short of breath. I was on prednisone and Im on Keflex for a UTI. I dont know if the antibiotic could cause it. To be safe I thought I should ask you.   Thank you, Charissa Burr

## 2023-04-27 NOTE — PROGRESS NOTES
[facility] CARDIOLOGY  [address] [facility], [address]  PHONE: [phone]      23    NAME:  Micky Noonan  : 1950  MRN: [id number]         SUBJECTIVE:   Micky Noonan is a 81 y.o. female seen for a follow up visit regarding the following:     Chief Complaint   Patient presents with    Coronary Artery Disease    Annual Exam            HPI:  Follow up  Coronary Artery Disease and Annual Exam   .    Follow up CAD,  Oxygen dependent COPD/interstitial lung disease, RA , Ballesteros's esophagus, lymphocytic colitis. She had COVID about 3 weeks ago, her dyspnea has been much worse since then, she's had more ankleedema, goes down overnight, and worsening neuropathic symptoms in the legs  with burning and pain, worse with exertion, though. Past cardiac history:   CT angiogram in  showed all grafts patent. LIMA to LAD, SVG to Cx, SVG to RCA   12: normal stress MPI   2014 Echo - normal LVEF, grade I diastolic function, no valve disease   2014 - Cath - patent grafts   2018 - 24 hour monitor - normal tracing, rates , average 90, no diary entries   Dec 2019 - normal perfusion and EF     May 2019- lumpectomy and tamoxifen for breast cancer   2020- 24 hr monitor - normal tracing. Symptoms of fast hb with rates in the low 100's, but faster rates asymptomatic, poor correlation, but not, the symptoms she was having that prompted the monitor. 2020- 30 day monitor - NSR with occasional PVC, rare PAC, single 6 beat run nsvt. Symptoms generally correlate with PVC but not always.   Low PVC burden, ~1%.(discussed with patient, wishes to avoid therapy for now, tolerates BB poorly, would consider  diltiazem if she changes her mind)   May 2021- breast attenuation, normal EF, no ischemia   Echo - normal SF, DF and valves, normal RVSP                 [place] CAD CHF Meds            lisinopril (PRINIVIL;ZESTRIL) 40 MG tablet (Taking)    Sig: TAKE 1 TABLET

## 2023-04-28 ENCOUNTER — OFFICE VISIT (OUTPATIENT)
Dept: CARDIOLOGY CLINIC | Age: 73
End: 2023-04-28

## 2023-04-28 VITALS
DIASTOLIC BLOOD PRESSURE: 60 MMHG | HEART RATE: 118 BPM | BODY MASS INDEX: 28.74 KG/M2 | WEIGHT: 156.2 LBS | SYSTOLIC BLOOD PRESSURE: 138 MMHG | HEIGHT: 62 IN

## 2023-04-28 DIAGNOSIS — I73.9 CLAUDICATION (HCC): ICD-10-CM

## 2023-04-28 DIAGNOSIS — R00.0 SINUS TACHYCARDIA: ICD-10-CM

## 2023-04-28 DIAGNOSIS — I10 ESSENTIAL HYPERTENSION: ICD-10-CM

## 2023-04-28 DIAGNOSIS — R06.02 SHORTNESS OF BREATH: ICD-10-CM

## 2023-04-28 DIAGNOSIS — I25.118 CORONARY ARTERY DISEASE OF NATIVE ARTERY OF NATIVE HEART WITH STABLE ANGINA PECTORIS (HCC): Primary | ICD-10-CM

## 2023-04-28 DIAGNOSIS — Z95.1 HX OF CABG: ICD-10-CM

## 2023-04-28 DIAGNOSIS — E78.5 DYSLIPIDEMIA: ICD-10-CM

## 2023-04-28 ASSESSMENT — ENCOUNTER SYMPTOMS: SHORTNESS OF BREATH: 1

## 2023-04-28 NOTE — PATIENT INSTRUCTIONS
Patient Education        Shortness of Breath: Care Instructions  Your Care Instructions     Shortness of breath has many causes. Sometimes conditions such as anxiety can lead to shortness of breath. Some people get mild shortness of breath when they exercise. Trouble breathing also can be a symptom of a serious problem, such as asthma, lung disease, emphysema, heart problems, and pneumonia. If your shortness of breath continues, you may need tests and treatment. Watch for any changes in your breathing and other symptoms. Follow-up care is a key part of your treatment and safety. Be sure to make and go to all appointments, and call your doctor if you are having problems. It's also a good idea to know your test results and keep a list of the medicines you take. How can you care for yourself at home? Do not smoke or allow others to smoke around you. If you need help quitting, talk to your doctor about stop-smoking programs and medicines. These can increase your chances of quitting for good. Get plenty of rest and sleep. Take your medicines exactly as prescribed. Call your doctor if you think you are having a problem with your medicine. Find healthy ways to deal with stress. Exercise daily. Get plenty of sleep. Eat regularly and well. When should you call for help? Call 911 anytime you think you may need emergency care. For example, call if:    You have severe shortness of breath. You have symptoms of a heart attack. These may include:  Chest pain or pressure, or a strange feeling in the chest.  Sweating. Shortness of breath. Nausea or vomiting. Pain, pressure, or a strange feeling in the back, neck, jaw, or upper belly or in one or both shoulders or arms. Lightheadedness or sudden weakness. A fast or irregular heartbeat. After you call 911, the  may tell you to chew 1 adult-strength or 2 to 4 low-dose aspirin. Wait for an ambulance. Do not try to drive yourself.    Call your doctor now or

## 2023-05-10 NOTE — DISCHARGE INSTRUCTIONS
COPD Exacerbation Plan: Care Instructions  Your Care Instructions    If you have chronic obstructive pulmonary disease (COPD), your usual shortness of breath could suddenly get worse. You may start coughing more and have more mucus. This flare-up is called a COPD exacerbation (say \"hu-EVY-ye-BAY-monisha\"). A lung infection or air pollution could set off an exacerbation. Sometimes it can happen after a quick change in temperature or being around chemicals. Work with your doctor to make a plan for dealing with an exacerbation. You can better manage it if you plan ahead. Follow-up care is a key part of your treatment and safety. Be sure to make and go to all appointments, and call your doctor if you are having problems. It's also a good idea to know your test results and keep a list of the medicines you take. How can you care for yourself at home? During an exacerbation  · Do not panic if you start to have one. Quick treatment at home may help you prevent serious breathing problems. If you have a COPD exacerbation plan that you developed with your doctor, follow it. · Take your medicines exactly as your doctor tells you. ¨ Use your inhaler as directed by your doctor. If your symptoms do not get better after you use your medicine, have someone take you to the emergency room. Call an ambulance if necessary. ¨ With inhaled medicines, a spacer or a nebulizer may help you get more medicine to your lungs. Ask your doctor or pharmacist how to use them properly. Practice using the spacer in front of a mirror before you have an exacerbation. This may help you get the medicine into your lungs quickly. ¨ If your doctor has given you steroid pills, take them as directed. ¨ Your doctor may have given you a prescription for antibiotics, which you can fill if you need it. ¨ Talk to your doctor if you have any problems with your medicine.  And call your doctor if you have to use your antibiotic or steroid pills.  Preventing an exacerbation  · Do not smoke. This is the most important step you can take to prevent more damage to your lungs and prevent problems. If you already smoke, it is never too late to stop. If you need help quitting, talk to your doctor about stop-smoking programs and medicines. These can increase your chances of quitting for good. · Take your daily medicines as prescribed. · Avoid colds and flu. ¨ Get a pneumococcal vaccine. ¨ Get a flu vaccine each year, as soon as it is available. Ask those you live or work with to do the same, so they will not get the flu and infect you. ¨ Try to stay away from people with colds or the flu. ¨ Wash your hands often. · Avoid secondhand smoke; air pollution; cold, dry air; hot, humid air; and high altitudes. Stay at home with your windows closed when air pollution is bad. · Learn breathing techniques for COPD, such as breathing through pursed lips. These techniques can help you breathe easier during an exacerbation. When should you call for help? Call 911 anytime you think you may need emergency care. For example, call if:  ? · You have severe trouble breathing. ? · You have severe chest pain. ?Call your doctor now or seek immediate medical care if:  ? · You have new or worse shortness of breath. ? · You develop new chest pain. ? · You are coughing more deeply or more often, especially if you notice more mucus or a change in the color of your mucus. ? · You cough up blood. ? · You have new or increased swelling in your legs or belly. ? · You have a fever. ? Watch closely for changes in your health, and be sure to contact your doctor if:  ? · You need to use your antibiotic or steroid pills. ? · Your symptoms are getting worse. Where can you learn more? Go to http://layo-alethea.info/. Enter Q253 in the search box to learn more about \"COPD Exacerbation Plan: Care Instructions. \"  Current as of:  May 12, 2017  Content Version: 11.4  © 9156-8621 [employer]. Care instructions adapted under license by [employer] (which disclaims liability or warranty for this information). If you have questions about a medical condition or this instruction, always ask your healthcare professional. [address] any warranty or liability for your use of this information. Subacute hip pain, unclear etiology, worsening  Sounds like MSK pain, perhaps a strain from being relatively immobile; less likely OM given non-toxic appearing, no fevers, WBC count at his baseline  - will order MR of left hip for further evaluation (can also evaluate whether perianal fistula with lumbosacral MR)

## (undated) DEVICE — FORCEPS BX L240CM JAW DIA2.8MM L CAP W/ NDL MIC MESH TOOTH

## (undated) DEVICE — CANNULA NSL ORAL AD FOR CAPNOFLEX CO2 O2 AIRLFE

## (undated) DEVICE — KENDALL RADIOLUCENT FOAM MONITORING ELECTRODE RECTANGULAR SHAPE: Brand: KENDALL

## (undated) DEVICE — CONNECTOR TBNG OD5-7MM O2 END DISP

## (undated) DEVICE — SYR 5ML 1/5 GRAD LL NSAF LF --

## (undated) DEVICE — CONTAINER PREFIL FRMLN 40ML --

## (undated) DEVICE — SYR 3ML LL TIP 1/10ML GRAD --

## (undated) DEVICE — NDL PRT INJ NSAF BLNT 18GX1.5 --

## (undated) DEVICE — BLOCK BITE AD 60FR W/ VELC STRP ADDRESSES MOST PT AND